# Patient Record
Sex: MALE | NOT HISPANIC OR LATINO | Employment: UNEMPLOYED | URBAN - NONMETROPOLITAN AREA
[De-identification: names, ages, dates, MRNs, and addresses within clinical notes are randomized per-mention and may not be internally consistent; named-entity substitution may affect disease eponyms.]

---

## 2018-05-10 ENCOUNTER — OFFICE VISIT (OUTPATIENT)
Dept: ORTHOPEDIC SURGERY | Facility: CLINIC | Age: 43
End: 2018-05-10

## 2018-05-10 VITALS — TEMPERATURE: 97.2 F | BODY MASS INDEX: 24.44 KG/M2 | WEIGHT: 165 LBS | HEIGHT: 69 IN

## 2018-05-10 DIAGNOSIS — M17.12 PRIMARY OSTEOARTHRITIS OF LEFT KNEE: ICD-10-CM

## 2018-05-10 DIAGNOSIS — M17.11 PRIMARY OSTEOARTHRITIS OF RIGHT KNEE: Primary | ICD-10-CM

## 2018-05-10 PROCEDURE — 99214 OFFICE O/P EST MOD 30 MIN: CPT | Performed by: ORTHOPAEDIC SURGERY

## 2018-05-10 RX ORDER — ARIPIPRAZOLE 2 MG/1
TABLET ORAL
COMMUNITY
Start: 2018-04-13

## 2018-05-10 RX ORDER — BUSPIRONE HYDROCHLORIDE 15 MG/1
TABLET ORAL
COMMUNITY
Start: 2018-04-13

## 2018-05-10 RX ORDER — GABAPENTIN 600 MG/1
TABLET ORAL
Refills: 5 | COMMUNITY
Start: 2018-03-13

## 2018-05-10 NOTE — PROGRESS NOTES
Chief Complaint   Patient presents with   • Left Knee - Follow-up   • Right Knee - Follow-up           HPI  Patient is here today for bilateral knee pain.Patient states that he is having pain and discomfort in both his knees.  He has difficulty going up and down the steps.  He has symptoms of instability of both his knees.  We had checked him out and found that there was no indication for surgical intervention.  He uses his brace all the time.  In fact he states that the straps and the supports for the knee brace have pulled out and he would like a new brace today for both his knees.  He has pain with numbness and tingling in both upper extremities.  He has been diagnosed with carpal tunnel syndrome in the past and would like braces for the hands as well.  The patient was recently seen by rheumatologist and some blood work has been performed.  The patient will go back to that doctor's office to see if he is a candidate for anti-inflammatory medication versus disease modifying agents orally.  He is also complaining of right ankle pain and discomfort.  The patient states that he needs a cane for assistance with ambulation because he has a high degree of propensity towards falling because of his knee and ankle disease.  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  He can no longer do that job because of the symptoms from his knees, his wrists and his ankle.  I have suggested to him that he should go before a disability hearing  and see if he can qualify for disability.  It is unlikely that he will go back to that form of employment because of his multiple orthopedic issues.         Vitals:    05/10/18 1404   Temp: 97.2 °F (36.2 °C)           Review of Systems   Constitutional: Negative.    HENT: Negative.    Respiratory: Negative.    Cardiovascular: Negative.    Gastrointestinal:  Negative.    Endocrine: Negative.    Genitourinary: Negative.    Musculoskeletal: Positive for gait problem.   Skin: Negative.    Allergic/Immunologic: Negative.    Hematological: Negative.    Psychiatric/Behavioral: Negative.            Physical Exam   Constitutional: He is oriented to person, place, and time. He appears well-nourished.   HENT:   Head: Atraumatic.   Eyes: EOM are normal.   Neck: Neck supple.   Cardiovascular: Normal rate, regular rhythm, normal heart sounds and intact distal pulses.    Pulmonary/Chest: Effort normal and breath sounds normal.   Abdominal: Bowel sounds are normal.   Musculoskeletal: He exhibits edema and tenderness.   Neurological: He is alert and oriented to person, place, and time. He has normal reflexes.   Skin: Skin is warm.   Psychiatric: He has a normal mood and affect. His behavior is normal. Judgment and thought content normal.   Nursing note and vitals reviewed.          Joint/Body Part Specific Exam:  bilateral knee. Patient has crepitus throughout range of motion. Positive patellar grind test. Mild effusion. Lachman is negative. Pivot shift is negative. Anterior and posterior drawer signs are negative. Significant joint line tenderness is noted on the medial aspect of the knee. Patient has a varus orientation of the knee. There is fullness and tenderness in the Popliteal fossa. Mild distention of a Popliteal cyst is noted in this location. Range of motion in flexion is from 0- 110° degrees. Neurovascular status is intact.  Dorsalis pedis and posterior tibial artery pulses are palpable. Common peroneal nerve function is well preserved. Patient's gait is cautious and antalgic. Skin and soft tissues are mildly swollen, consistent with synovitis and effusion. The patient has a significant limp with the first few steps after starting the gait cycle. Getting out of a chair takes a lot of effort due to pain on knee flexion.    bilateral wrist. Skin mildly swollen volarly. Radial  pulse is palpable. Capillary refill is 2 seconds with a brisk return. Positive Tinel’s sign at the wrist. Positive Phalen’s sign.  strength is impaired. Flexor and extensor tendon functions are well preserved. Moving 2 point discrimination is prolonged to 12mm over the median nerve distribution. No evidence of RSD.  X-RAY Report:        Diagnostics:        Kimani was seen today for follow-up and follow-up.    Diagnoses and all orders for this visit:    Primary osteoarthritis of right knee    Primary osteoarthritis of left knee            Procedures        Plan:  Use a supportive brace to both the left and the right knee to prevent the knees from buckling and giving out.    Tablet ibuprofen 600 mg orally twice a day for pain swelling and discomfort.    Use a supportive carpal tunnel brace to both upper extremities to minimize the pressure over the transverse carpal ligament.    Vitamin B6 And B12 100 µg tablet 1 by mouth daily for nerve function improvement.    Use a cane/walker for assistance with ambulation and to minimize falling.    Try articular steroid injection and Synvisc Visco supplementation for the knees discussed with the patient.    He is not a candidate for surgical intervention either on the knee or the upper extremity at this point.    His work status is limited to maximum lifting of no more than 10 pounds.  He is recommended not to climb on ladders and scaffolds.  And also the patient has been recommended to use a cane for assistance with ambulation.  These will be permanent restrictions for the patient.    Follow-up in my office in 6 months.    Time spent in the office today with the patient is 30 minutes.  Greater than 50% of my time was spent face-to-face with the patient about guiding him how to manage his symptoms and nonoperative care for the multiple joint involvement.

## 2018-10-03 ENCOUNTER — TELEPHONE (OUTPATIENT)
Dept: ORTHOPEDIC SURGERY | Facility: CLINIC | Age: 43
End: 2018-10-03

## 2018-10-03 NOTE — TELEPHONE ENCOUNTER
PATIENT LEFT VOICEMAIL AT 1:28 PM REQUESTING SOMEONE TO CALL TO VERIFY HIS NEXT APPOINTMENT WITH DR. BALDWIN.  I CALLED AND LEFT VOICEMAIL FOR PATIENT THAT HIS APPOINTMENT IS 11/01/18 @ 1:45 PM

## 2018-11-01 ENCOUNTER — OFFICE VISIT (OUTPATIENT)
Dept: ORTHOPEDIC SURGERY | Facility: CLINIC | Age: 43
End: 2018-11-01

## 2018-11-01 DIAGNOSIS — M17.11 PRIMARY OSTEOARTHRITIS OF RIGHT KNEE: Primary | ICD-10-CM

## 2018-11-01 DIAGNOSIS — M17.12 PRIMARY OSTEOARTHRITIS OF LEFT KNEE: ICD-10-CM

## 2018-11-01 PROCEDURE — 99213 OFFICE O/P EST LOW 20 MIN: CPT | Performed by: ORTHOPAEDIC SURGERY

## 2018-11-01 NOTE — PROGRESS NOTES
Chief Complaint   Patient presents with   • Left Knee - Follow-up   • Right Knee - Follow-up           HPI  Patient is here today for a six month follow up of his bilateral knees.  He patient states that he has increasing pain and discomfort in both his knees.  The pain is on the medial aspect of the knee.  He states that the right side has become so bad that he can barely walk.  He has difficulty going up and down the steps.  He states that he is using a wheeled walker now because he feels the knee will buckle and give out from underneath him.  His quality of life has become very negative.  He has pain and discomfort over both the wrists and states that the transverse carpal ligament is extremely painful for him.  He has some numbness and tingling in both the upper extremities consistent with compression of the median nerve over the volar aspect of the wrist.  He states that his  strength has become progressively compromised as well.  He has chronic low back pain with radiation of pain and discomfort in both his hips and his thighs.  He states that he needs an appointment with a pain clinic but is finding that hard to do so.  One of the issues is that he self medicates himself with cannabis and that is always showing up on his tox screens.  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  Unfortunately, I am unable to help him on this aspect of his management with long-term narcotic needs.  I have been very up front and clear with this patient in that respect.        There were no vitals filed for this visit.        Review of Systems   Constitutional: Negative.    HENT: Negative.    Eyes: Negative.    Respiratory: Negative.    Cardiovascular: Negative.    Gastrointestinal: Negative.    Endocrine: Negative.    Genitourinary: Negative.    Musculoskeletal: Positive for gait problem and joint swelling.   Skin: Negative.    Allergic/Immunologic: Negative.    Hematological: Negative.     Psychiatric/Behavioral: Negative.            Physical Exam   Constitutional: He is oriented to person, place, and time. He appears well-nourished.   HENT:   Head: Atraumatic.   Eyes: EOM are normal.   Neck: Neck supple.   Cardiovascular: Normal heart sounds and intact distal pulses.   Pulmonary/Chest: Breath sounds normal.   Abdominal: Bowel sounds are normal.   Musculoskeletal: He exhibits edema and tenderness.   Neurological: He is alert and oriented to person, place, and time.   Skin: Skin is dry. Capillary refill takes 2 to 3 seconds.   Psychiatric: He has a normal mood and affect. His behavior is normal. Thought content normal.   Nursing note and vitals reviewed.          Joint/Body Part Specific Exam:  bilateral knee. Patient has crepitus throughout range of motion. Positive patellar grind test. Mild effusion. Lachman is negative. Pivot shift is negative. Anterior and posterior drawer signs are negative. Significant joint line tenderness is noted on the medial aspect of the knee. Patient has a varus orientation of the knee. There is fullness and tenderness in the Popliteal fossa. Mild distention of a Popliteal cyst is noted in this location. Range of motion in flexion is from 0- 110 degrees. Neurovascular status is intact.  Dorsalis pedis and posterior tibial artery pulses are palpable. Common peroneal nerve function is well preserved. Patient's gait is cautious and antalgic. Skin and soft tissues are mildly swollen, consistent with synovitis and effusion. The patient has a significant limp with the first few steps after starting the gait cycle. Getting out of a chair takes a lot of effort due to pain on knee flexion.    Lumbar Spine: The overlying skin and soft tissues are mildly swollen. Deep tendon reflexes are bilaterally, symmetric and equal.  No long tract signs are noted. There is No evidence of myelopathy. No bowel or bladder deficit noted. Mild spasm of erector spinae muscle is noted. bilaterally  sided rotation is associated with pain and discomfort. Straight leg raise test is positive to 60 degrees. Contralateral straight leg raise is negative. Pain radiates to buttock and thigh. Get up and go is slow due to the lumbar pain.No objective motor or sensory function loss is noted.     bilateral wrist. The Skin is  mildly swollen volarly over the proximal crease of the wrist. Radial pulse is palpable. Capillary refill is 2 seconds with a brisk return. Positive Tinel’s sign at the wrist. Positive Phalen’s sign at the wrist .  strength is impaired.  There is no muscle wasting or atrophy specifically involving the thenar muscle group.  Sensation to light touch and pinprick are diminished over the thumb, index and middle fingers.  Flexor and extensor tendon functions are well preserved. Moving 2 point discrimination is prolonged to 12mm over the median nerve distribution. No evidence of RSD.  There is no clinical evidence of renal disease, thyroid disease or any generalized neurological condition that could be causing nerve dysfunction.  X-RAY Report:        Diagnostics:        Kimani was seen today for follow-up and follow-up.    Diagnoses and all orders for this visit:    Primary osteoarthritis of right knee    Primary osteoarthritis of left knee            Procedures        Plan: Use a supportive brace on both the knees to prevent the knees from buckling and giving out.    Use a brace on the wrist to minimize the pressure over the transverse carpal ligament and to minimize the symptoms of carpal tunnel syndrome.    Sae back school exercise program with stretching and strengthening exercises.    Tablet ibuprofen 600 mg orally twice a day for pain swelling and discomfort.    The patient will have his PCP to attempts to make him an appointment with a pain clinic so that his need for chronic long-term use of narcotics can be addressed in that fashion.  Think    Work restrictions placed on the patient.  He is  limited to lifting a maximum of 10 pounds and no more than that.  He is to use a cane/walker to prevent falls.  No ladders or scaffolds are allowed for this patient.  These restrictions are on a permanent ongoing basis.    Calcium and vitamin D for bone health.    Follow-up in my office in 6 months for reevaluation

## 2019-04-02 ENCOUNTER — OFFICE VISIT (OUTPATIENT)
Dept: ORTHOPEDIC SURGERY | Facility: CLINIC | Age: 44
End: 2019-04-02

## 2019-04-02 VITALS — BODY MASS INDEX: 28.88 KG/M2 | TEMPERATURE: 98.1 F | HEIGHT: 69 IN | WEIGHT: 195 LBS

## 2019-04-02 DIAGNOSIS — M75.41 SHOULDER IMPINGEMENT SYNDROME, RIGHT: ICD-10-CM

## 2019-04-02 DIAGNOSIS — M79.642 HAND PAIN, LEFT: Primary | ICD-10-CM

## 2019-04-02 DIAGNOSIS — M19.042 PRIMARY OSTEOARTHRITIS OF BOTH HANDS: ICD-10-CM

## 2019-04-02 DIAGNOSIS — G56.03 BILATERAL CARPAL TUNNEL SYNDROME: ICD-10-CM

## 2019-04-02 DIAGNOSIS — M19.041 PRIMARY OSTEOARTHRITIS OF BOTH HANDS: ICD-10-CM

## 2019-04-02 PROCEDURE — 99213 OFFICE O/P EST LOW 20 MIN: CPT | Performed by: ORTHOPAEDIC SURGERY

## 2019-04-02 PROCEDURE — 73120 X-RAY EXAM OF HAND: CPT | Performed by: ORTHOPAEDIC SURGERY

## 2019-04-02 NOTE — PROGRESS NOTES
NEW/FOLLOW UP VISIT    Patient: Kimani Michaels  ?  YOB: 1975  ?  Chief Complaint   Patient presents with   • Left Hand - Pain      ?  HPI: This patient has bilateral upper extremity pain and discomfort.  He states that the pain starts in his shoulders and radiates down the arm.  He has been having locking and triggering of the left fourth and fifth digits.  He states that he is in constant pain normal in the upper extremities but in the lower extremities as well.  He denies any history of trauma.  The patient states that he cannot play his guitar for more than 30 minutes at a time.  He has numbness and tingling radiated to the thumb, middle and index fingers.   strength is getting progressively compromised.  He has a sense of swelling and tightness in the PIP and DIP joint of the fingers.  The patient states since he has to shake his hand constantly to restore the sensation to the upper extremity.      This patient is an established patient.  This problem is not new to this examiner.    Allergies: No Known Allergies    Medications:   Home Medications:  Current Outpatient Medications on File Prior to Visit   Medication Sig   • acetaminophen-codeine (TYLENOL #3) 300-30 MG per tablet TK 1 T PO Q 8 H PRN P.   • ARIPiprazole (ABILIFY) 2 MG tablet    • baclofen (LIORESAL) 10 MG tablet TK 1 T PO BID PRN.   • busPIRone (BUSPAR) 15 MG tablet    • gabapentin (NEURONTIN) 400 MG capsule TK ONE C PO  TID   • gabapentin (NEURONTIN) 600 MG tablet TK 1 T PO TID   • lidocaine-prilocaine (EMLA) cream    • meloxicam (MOBIC) 15 MG tablet TK 1 T PO ONCE D.     No current facility-administered medications on file prior to visit.      Current Medications:  Scheduled Meds:  PRN Meds:.    I have reviewed the patient's medical history in detail and updated the computerized patient record.  Review and summarization of old records include:    Past Medical History:   Diagnosis Date   • Depression    • Rheumatoid arthritis  "(CMS/[facility])      Past Surgical History:   Procedure Laterality Date   • KNEE SURGERY     • TONSILLECTOMY       Social History     Occupational History   • Not on file   Tobacco Use   • Smoking status: Current Every Day Smoker   Substance and Sexual Activity   • Alcohol use: No   • Drug use: Not on file   • Sexual activity: Not on file      Social History     Social History Narrative   • Not on file     Family History   Family history unknown: Yes         Review of Systems   Constitutional: Negative.    HENT: Negative.    Eyes: Negative.    Respiratory: Negative.    Cardiovascular: Negative.    Gastrointestinal: Negative.    Endocrine: Negative.    Musculoskeletal: Positive for joint swelling.   Skin: Negative.    Allergic/Immunologic: Negative.    Neurological: Negative.    Hematological: Negative.    Psychiatric/Behavioral: Negative.           Wt Readings from Last 3 Encounters:   04/02/19 88.5 kg (195 lb)   05/10/18 74.8 kg (165 lb)     Ht Readings from Last 3 Encounters:   04/02/19 174 cm (68.5\")   05/10/18 175.3 cm (69\")     Body mass index is 29.22 kg/m².  Facility age limit for growth percentiles is 20 years.  Vitals:    04/02/19 1026   Temp: 98.1 °F (36.7 °C)         Physical Exam   Constitutional: Patient is oriented to person, place, and time. Appears well-developed and well-nourished.   HENT:   Head: Normocephalic and atraumatic.   Eyes: Conjunctivae and EOM are normal. Pupils are equal, round, and reactive to light.   Cardiovascular: Normal rate, regular rhythm, normal heart sounds and intact distal pulses.   Pulmonary/Chest: Effort normal and breath sounds normal.   Musculoskeletal:   See detailed exam below   Neurological: Alert and oriented to person, place, and time. No sensory deficit. Coordination normal.   Skin: Skin is warm and dry. Capillary refill takes less than 2 seconds. No rash noted. No erythema.   Psychiatric: Patient has a normal mood and affect. Her behavior is normal. Judgment and thought " content normal.   Nursing note and vitals reviewed.    Ortho Exam:   bilateral Shoulder. Patient has signs of impingement with internal and external rotation. There is a lot of pain and tenderness for the patient over the subcromial bursa. Montanez’s sign is positive. Neer sign is positive. Forward flexion is 0-130 degrees, abduction is 0-130 degrees, external rotation is 0-40 degrees. Rotator cuff function is fairly well preserved except for the impingement at 90 degrees. Apprehension sign is negative. Axillary nerve function is well preserved. Radial artery pulses are palpable. There is no evidence of multidirectional instability. Sulcus sign is negative. Drop arm sign is negative. The patient has some ill-defined tenderness over the greater tuberosity of the humerus. The pain level is 6.    bilateral wrist. The Skin is  mildly swollen volarly over the proximal crease of the wrist. Radial pulse is palpable. Capillary refill is 2 seconds with a brisk return. Positive Tinel’s sign at the wrist. Positive Phalen’s sign at the wrist .  strength is impaired.  There is no muscle wasting or atrophy specifically involving the thenar muscle group.  Sensation to light touch and pinprick are diminished over the thumb, index and middle fingers.  Flexor and extensor tendon functions are well preserved. Moving 2 point discrimination is prolonged to 12mm over the median nerve distribution. No evidence of RSD.  There is no clinical evidence of renal disease, thyroid disease or any generalized neurological condition that could be causing nerve dysfunction.    Bilateral hand. Patient has a significant pain and swelling over the dorsal aspect of the DIP/PIP joints and multiple joints of the hand. There is the presence of Heberden’s nodes. There is some deformity with ulnar and radial deviation of the distal part of the DIP joint. Flexor and extensor tendon functions are well preserved. Patient makes a fist slowly and painfully so.   strength to both pinch and power  is limited and the patient has a difficult time making a fist. Local tenderness is consistent with both arthritis as well as synovitis. There is some pitting of the nail plate.    left wrist. Trigger fourth and fifth digits on the left hand. Skin is swollen over volar aspect of MCP joint. Flexor and extensor tendon functions are both well maintained. Tender nodule over the volar aspect of MCP joint. Capillary refill is two seconds with a brisk return. Triggering is passively correctable. Median nerve function is normal. Radial artery pulse is palpable.  Diagnostics: X-rays from an outside facility are reviewed.  X-rays of the wrist AP and lateral show some narrowing of the radiocarpal joint but otherwise the joint space and contour is fairly well-preserved.       Assessment:  Kimani was seen today for pain.    Diagnoses and all orders for this visit:    Hand pain, left  -     XR Hand 2 View Left    Bilateral carpal tunnel syndrome    Primary osteoarthritis of both hands    Shoulder impingement syndrome, right          Procedures  ?    Plan  Avoid repetitive lifting and loading of the upper extremity to minimize trauma to the median nerve.  Vitamin B6, B12 100 mcg tab 1 p.o. daily for nerve function improvement.  Injection to the hypertrophic A1 pulley of the third fourth and fifth digits on the left hand discussed with the patient and he will let me know if his symptoms are bad enough for that form of intervention.  Active aggressive range of motion of the shoulder to prevent arthrofibrosis and a frozen shoulder.  Intra-articular injection of steroid to the shoulder discussed with the patient.  For the extent of diagnosis of compression of the median nerve, he will need an EMG/nerve conduction study and the patient will let me know when he is ready for that form of diagnostic intervention.  · Compression/brace for the upper extremity has pressure over the transverse carpal  ligament.  · Rest, ice, compression, and elevation (RICE) therapy  · Stretching and strengthening exercises  · OTC Tylenol 1000mg by mouth every 4-6 hours as needed for pain   · Follow up in 6 month(s)      Saud Paredes MD  4/21/2019

## 2019-04-21 PROBLEM — M79.642 HAND PAIN, LEFT: Status: ACTIVE | Noted: 2019-04-21

## 2019-04-21 PROBLEM — M19.042 PRIMARY OSTEOARTHRITIS OF BOTH HANDS: Status: ACTIVE | Noted: 2019-04-21

## 2019-04-21 PROBLEM — M75.41 SHOULDER IMPINGEMENT SYNDROME, RIGHT: Status: ACTIVE | Noted: 2019-04-21

## 2019-04-21 PROBLEM — M19.041 PRIMARY OSTEOARTHRITIS OF BOTH HANDS: Status: ACTIVE | Noted: 2019-04-21

## 2019-05-02 ENCOUNTER — OFFICE VISIT (OUTPATIENT)
Dept: ORTHOPEDIC SURGERY | Facility: CLINIC | Age: 44
End: 2019-05-02

## 2019-05-02 DIAGNOSIS — G56.03 BILATERAL CARPAL TUNNEL SYNDROME: Primary | ICD-10-CM

## 2019-05-02 DIAGNOSIS — M17.11 PRIMARY OSTEOARTHRITIS OF RIGHT KNEE: ICD-10-CM

## 2019-05-02 DIAGNOSIS — M75.41 SHOULDER IMPINGEMENT SYNDROME, RIGHT: ICD-10-CM

## 2019-05-02 PROCEDURE — 99213 OFFICE O/P EST LOW 20 MIN: CPT | Performed by: ORTHOPAEDIC SURGERY

## 2019-05-02 RX ORDER — MELOXICAM 7.5 MG/1
TABLET ORAL
Qty: 90 TABLET | Refills: 1 | Status: SHIPPED | OUTPATIENT
Start: 2019-05-02

## 2019-11-07 ENCOUNTER — OFFICE VISIT (OUTPATIENT)
Dept: ORTHOPEDIC SURGERY | Facility: CLINIC | Age: 44
End: 2019-11-07

## 2019-11-07 VITALS — TEMPERATURE: 98.2 F | HEIGHT: 70 IN | WEIGHT: 195 LBS | BODY MASS INDEX: 27.92 KG/M2

## 2019-11-07 DIAGNOSIS — G56.03 BILATERAL CARPAL TUNNEL SYNDROME: Primary | ICD-10-CM

## 2019-11-07 DIAGNOSIS — M17.12 PRIMARY OSTEOARTHRITIS OF LEFT KNEE: ICD-10-CM

## 2019-11-07 DIAGNOSIS — M19.041 PRIMARY OSTEOARTHRITIS OF BOTH HANDS: ICD-10-CM

## 2019-11-07 DIAGNOSIS — M19.042 PRIMARY OSTEOARTHRITIS OF BOTH HANDS: ICD-10-CM

## 2019-11-07 DIAGNOSIS — M77.8 TENDINITIS OF BOTH ELBOWS: ICD-10-CM

## 2019-11-07 DIAGNOSIS — M17.11 PRIMARY OSTEOARTHRITIS OF RIGHT KNEE: ICD-10-CM

## 2019-11-07 PROCEDURE — 99213 OFFICE O/P EST LOW 20 MIN: CPT | Performed by: ORTHOPAEDIC SURGERY

## 2019-11-12 PROBLEM — M77.8 TENDINITIS OF BOTH ELBOWS: Status: ACTIVE | Noted: 2019-11-12

## 2019-11-12 NOTE — PROGRESS NOTES
FOLLOW UP VISIT    Patient: Kimani Michaels  ?  YOB: 1975    MRN: [id number]  ?  Chief Complaint   Patient presents with   • Left Knee - Follow-up   • Right Knee - Follow-up   • Left Wrist - Follow-up   • Right Wrist - Follow-up      ?  HPI: The patient has multiple sites of muscular skeletal complaints.  He states that both his knees are bothering him to some degree.  He also has bilateral carpal tunnel syndrome and bilateral elbow tendinitis.  The symptoms are ongoing.  He has been seen by rheumatologist and it has been determined that he basically has osteoarthritis at multiple locations.  There is possibility of fibromyalgia as well which is most likely causing repetitive symptoms.  He does not have his seropositive traumatic disease that can be treated with disease modifying agents.  We have counseled the patient aggressive range of motion exercises and anti-inflammatory medication he also has braces for his wrists he is to support the function of those joints.    Pain Location: bilateral knee(s) and bilateral elbow  Radiation: none  Quality: dull, aching  Intensity/Severity: moderate  Duration: several year(s)  Onset quality: gradual   Timing: intermittent  Aggravating Factors: any weight bearing, going up and down stairs, kneeling, pivoting, rising after sitting, squatting, reaching forward, reaching backward and repetitive motion  Alleviating Factors: OTC analgesics, NSAID's and cold  Previous Episodes: yes  Associated Symptoms: pain, clicking/popping, decreased strength, numbness/tingling  ADL Affected: ambulating and grooming  Previous Treatment: Anti-inflammatory medication and intra-articular injections.    This patient is an established patient.  This problem is not new to this examiner.      Allergies: No Known Allergies    Medications:   Home Medications:  Current Outpatient Medications on File Prior to Visit   Medication Sig   • acetaminophen-codeine (TYLENOL #3) 300-30 MG per tablet TK  "1 T PO Q 8 H PRN P.   • ARIPiprazole (ABILIFY) 2 MG tablet    • baclofen (LIORESAL) 10 MG tablet TK 1 T PO BID PRN.   • busPIRone (BUSPAR) 15 MG tablet    • gabapentin (NEURONTIN) 400 MG capsule TK ONE C PO  TID   • gabapentin (NEURONTIN) 600 MG tablet TK 1 T PO TID   • lidocaine-prilocaine (EMLA) cream    • meloxicam (MOBIC) 7.5 MG tablet 1 Oral Daily with food.     No current facility-administered medications on file prior to visit.      Current Medications:  Scheduled Meds:  PRN Meds:.    I have reviewed the patient's medical history in detail and updated the computerized patient record.  Review and summarization of old records include:    Past Medical History:   Diagnosis Date   • Depression    • Rheumatoid arthritis (CMS/HCC)      Past Surgical History:   Procedure Laterality Date   • KNEE SURGERY     • TONSILLECTOMY       Social History     Occupational History   • Not on file   Tobacco Use   • Smoking status: Current Every Day Smoker   Substance and Sexual Activity   • Alcohol use: No   • Drug use: Not on file   • Sexual activity: Not on file      Social History     Social History Narrative   • Not on file     Family History   Family history unknown: Yes         Review of Systems  Constitutional: Negative for appetite change.   HENT: Negative.    Eyes: Negative.    Respiratory: Negative.    Cardiovascular: Negative.    Gastrointestinal: Negative.    Endocrine: Negative.    Genitourinary: Negative.    Musculoskeletal: See details of exam below.  Skin: Negative.    Allergic/Immunologic: Negative.    Hematological: Negative.    Psychiatric/Behavioral: Negative.         Wt Readings from Last 3 Encounters:   11/07/19 88.5 kg (195 lb)   04/02/19 88.5 kg (195 lb)   05/10/18 74.8 kg (165 lb)     Ht Readings from Last 3 Encounters:   11/07/19 177.8 cm (70\")   04/02/19 174 cm (68.5\")   05/10/18 175.3 cm (69\")     Body mass index is 27.98 kg/m².  Facility age limit for growth percentiles is 20 years.  Vitals:    " 11/07/19 1353   Temp: 98.2 °F (36.8 °C)         Physical Exam  Constitutional: Patient is oriented to person, place, and time. Appears well-developed and well-nourished.   HENT:   Head: Normocephalic and atraumatic.   Eyes: Conjunctivae and EOM are normal. Pupils are equal, round, and reactive to light.   Cardiovascular: Normal rate, regular rhythm, normal heart sounds and intact distal pulses.   Pulmonary/Chest: Effort normal and breath sounds normal.   Musculoskeletal:   See detailed exam below   Neurological: Alert and oriented to person, place, and time. No sensory deficit. Coordination normal.   Skin: Skin is warm and dry. Capillary refill takes less than 2 seconds. No rash noted. No erythema.   Psychiatric: Patient has a normal mood and affect. His behavior is normal. Judgment and thought content normal.   Nursing note and vitals reviewed.      Ortho Exam:   Bilateral knee (cmp). The patients' patellar grind test is exquisitely postive.  A lot of pain and discomfort in the retropatellar area. The patient has high Q-angle. Range of motion is from 0-120 degrees of flexion. Anterior and posterior drawer signs are negative. No medial or lateral instability is noted. The patella tends to track laterally in high grades of flexion. A Slightly positive apprehension sign is noted. There is some tenderness over the medial patellofemoral ligaments. Skin and soft tissues are swollen; consistent with inflammatory changes of the patellofemoral joint. Dorsalis pedis and posterior tibial artery pulses are palpable. Common peroneal nerve function is well preserved. Quad mechanism is well preserved.      Bilateral wrist-carpal tunnel. The patient is right hand dominant. The Skin is mildly swollen volarly over the proximal crease of the wrist. Radial pulse is palpable. Capillary refill is 2 seconds with a brisk return. Positive Tinel's sign at the wrist. Positive Phalen's sign at the wrist .  strength is impaired.  There is  no muscle wasting or atrophy specifically involving the thenar muscle group.  Sensation to light touch and pinprick are diminished over the thumb, index and middle fingers.  Flexor and extensor tendon functions are well preserved. Moving 2 point discrimination is prolonged to 12mm over the median nerve distribution. No evidence of RSD.  There is no clinical evidence of renal disease, thyroid disease or any generalized neurological condition that could be causing nerve dysfunction.    Bilateral elbow-lateral epicondylitis. Relationship of 3 bony points is well preserved. There is no evidence of posterior interosseous nerve palsy. Mild effusion is noted of the elbow. There is no ulnar neuritis. Patient has a lot of pain and tenderness over the lateral/medial aspect of the elbow. Range of motion of the elbow is 0- 130 degrees of extension (nml 0), 0-90 degrees of supination (nml 0-90). Extension of the wrist against resistance bothers the patient significantly. Radial artery pulses are palpable. Skin and soft tissues are slightly swollen. There is point tenderness over the flexor pronator muscle mass/extensor supinator muscle mass.      Diagnostics:  no diagnostic testing performed this visit       Assessment:  Kimani was seen today for follow-up, follow-up, follow-up and follow-up.    Diagnoses and all orders for this visit:    Bilateral carpal tunnel syndrome    Primary osteoarthritis of both hands    Primary osteoarthritis of left knee    Primary osteoarthritis of right knee    Tendinitis of both elbows          Procedures  ?    Plan    · Compression/brace to both the knees to prevent them from buckling and giving out.  · Use a cock-up splint for the wrists bilaterally to prevent excess pressure over the transverse carpal ligament.  · Vitamin B6, B12 100 mcg tab 1 p.o. daily for nerve function improvement.  · Calcium and vitamin D for bone health.  · Glucosamine, chondroitin and turmeric for cartilage  health.  · Intra-articular steroid injection to the knees and steroid injection to the elbow discussed with the patient.  · Rest, ice, compression, and elevation (RICE) therapy  · Stretching and strengthening exercises of the quads and the hamstrings.    · Nonoperative management of conditions discussed with the patient at length.  · OTC Tylenol 500-1000mg by mouth every 6 hours as needed for pain   · Follow up in 6 month(s)    Date of encounter: 11/07/2019   Saud Paredes MD

## 2020-05-07 ENCOUNTER — OFFICE VISIT (OUTPATIENT)
Dept: ORTHOPEDIC SURGERY | Facility: CLINIC | Age: 45
End: 2020-05-07

## 2020-05-07 VITALS — WEIGHT: 180 LBS | TEMPERATURE: 98.3 F | HEIGHT: 69 IN | BODY MASS INDEX: 26.66 KG/M2

## 2020-05-07 DIAGNOSIS — M75.41 SHOULDER IMPINGEMENT SYNDROME, RIGHT: ICD-10-CM

## 2020-05-07 DIAGNOSIS — G56.03 BILATERAL CARPAL TUNNEL SYNDROME: ICD-10-CM

## 2020-05-07 DIAGNOSIS — M17.11 PRIMARY OSTEOARTHRITIS OF RIGHT KNEE: ICD-10-CM

## 2020-05-07 DIAGNOSIS — M17.12 PRIMARY OSTEOARTHRITIS OF LEFT KNEE: Primary | ICD-10-CM

## 2020-05-07 DIAGNOSIS — M19.041 PRIMARY OSTEOARTHRITIS OF BOTH HANDS: ICD-10-CM

## 2020-05-07 DIAGNOSIS — M19.042 PRIMARY OSTEOARTHRITIS OF BOTH HANDS: ICD-10-CM

## 2020-05-07 PROCEDURE — 99213 OFFICE O/P EST LOW 20 MIN: CPT | Performed by: ORTHOPAEDIC SURGERY

## 2020-05-07 RX ORDER — BUPROPION HYDROCHLORIDE 150 MG/1
TABLET ORAL DAILY
COMMUNITY
Start: 2020-03-30

## 2020-05-07 RX ORDER — FAMOTIDINE 20 MG/1
TABLET, FILM COATED ORAL
COMMUNITY
Start: 2020-02-24

## 2020-05-07 NOTE — PROGRESS NOTES
FOLLOW UP VISIT    Patient: Kimani Michaels  ?  YOB: 1975    MRN: [id number]  ?  Chief Complaint   Patient presents with   • Right Wrist - Follow-up, Pain   • Left Wrist - Pain, Follow-up   CC: Follow-up on bilateral wrist pain, bilateral hand pain and bilateral knee osteoarthritis.  ?  HPI: FU BILATERAL WRISTS  Kimani Contreras continues to have symptoms in different locations of the body.  Both his hands bother him significantly.  He has weakness of  strength.  He also has numbness and tingling along the thumb middle and the index fingers.  Both his knees are also symptomatic.  He has a difficult time going up and down the steps.  Occasionally the knees will buckle and give out from underneath him.  He states that he uses braces regularly on his hands, wrists and his knees.  He is also on gabapentin provided to him by his primary care physician for managing the symptoms of neuropathy in the upper extremities.  He is not interested in any form of surgical intervention on either the hands to relieve the carpal tunnel syndrome or on his knees.      Pain Location: bilateral knee and bilateral wrist  Radiation: none  Quality: dull, aching  Intensity/Severity: moderate  Duration: 2-3 years  Onset quality: gradual   Timing: intermittent  Aggravating Factors: kneeling, rising after sitting, walking, squatting, repetitive motion  Alleviating Factors: Tylenol, NSAIDs  Previous Episodes: yes  Associated Symptoms: pain, swelling, decreased strength  ADLs Affected: grooming/hygiene/toileting/personal care, ambulating  Previous Treatment: Anti-inflammatory medication and use of a brace on the knees.    This patient is an established patient.  This problem is not new to this examiner.      Allergies: No Known Allergies    Medications:   Home Medications:  Current Outpatient Medications on File Prior to Visit   Medication Sig   • acetaminophen-codeine (TYLENOL #3) 300-30 MG per tablet TK 1 T PO Q 8 H PRN P.    • ARIPiprazole (ABILIFY) 2 MG tablet    • baclofen (LIORESAL) 10 MG tablet TK 1 T PO BID PRN.   • buPROPion XL (WELLBUTRIN XL) 150 MG 24 hr tablet Take  by mouth Daily.   • busPIRone (BUSPAR) 15 MG tablet    • famotidine (PEPCID) 20 MG tablet TK 1 T PO BID FOR REFLUX   • gabapentin (NEURONTIN) 400 MG capsule TK ONE C PO  TID   • gabapentin (NEURONTIN) 600 MG tablet TK 1 T PO TID   • lidocaine-prilocaine (EMLA) cream    • meloxicam (MOBIC) 7.5 MG tablet 1 Oral Daily with food.   • oxaprozin (DAYPRO) 600 MG tablet TK 2 TS PO D WF     No current facility-administered medications on file prior to visit.      Current Medications:  Scheduled Meds:  PRN Meds:.    I have reviewed the patient's medical history in detail and updated the computerized patient record.  Review and summarization of old records include:    Past Medical History:   Diagnosis Date   • Depression    • Rheumatoid arthritis (CMS/HCC)      Past Surgical History:   Procedure Laterality Date   • KNEE SURGERY     • TONSILLECTOMY       Social History     Occupational History   • Not on file   Tobacco Use   • Smoking status: Current Every Day Smoker   Substance and Sexual Activity   • Alcohol use: No   • Drug use: Not on file   • Sexual activity: Not on file      Family History   Family history unknown: Yes         Review of Systems   Constitutional: Negative.  Negative for fever.   HENT: Negative.    Eyes: Negative.    Respiratory: Negative.    Cardiovascular: Negative.    Endocrine: Negative.    Genitourinary: Negative.    Musculoskeletal: Positive for arthralgias, gait problem and joint swelling.   Skin: Negative.  Negative for rash and wound.   Allergic/Immunologic: Negative.    Neurological: Negative for numbness.   Hematological: Negative.    Psychiatric/Behavioral: Negative.           Wt Readings from Last 3 Encounters:   05/07/20 81.6 kg (180 lb)   11/07/19 88.5 kg (195 lb)   04/02/19 88.5 kg (195 lb)     Ht Readings from Last 3 Encounters:  "  05/07/20 175.3 cm (69\")   11/07/19 177.8 cm (70\")   04/02/19 174 cm (68.5\")     Body mass index is 26.58 kg/m².  Facility age limit for growth percentiles is 20 years.  Vitals:    05/07/20 1332   Temp: 98.3 °F (36.8 °C)         Physical Exam  Constitutional: Patient is oriented to person, place, and time. Appears well-developed and well-nourished.   HENT:   Head: Normocephalic and atraumatic.   Eyes: Conjunctivae and EOM are normal. Pupils are equal, round, and reactive to light.   Cardiovascular: Normal rate, regular rhythm, normal heart sounds and intact distal pulses.   Pulmonary/Chest: Effort normal and breath sounds normal.   Musculoskeletal:   See detailed exam below   Neurological: Alert and oriented to person, place, and time. No sensory deficit. Coordination normal.   Skin: Skin is warm and dry. Capillary refill takes less than 2 seconds. No rash noted. No erythema.   Psychiatric: Patient has a normal mood and affect. His behavior is normal. Judgment and thought content normal.   Nursing note and vitals reviewed.      Ortho Exam:   Bilateral knee (varus). Patient has crepitus throughout range of motion. Positive patellar grind test. Mild effusion. Lachman is negative. Pivot shift is negative. Anterior and posterior drawer signs are negative. Significant joint line tenderness is noted on the medial aspect of the knee. Patient has a varus orientation of the knee. There is fullness and tenderness in the Popliteal fossa. Mild distention of a Popliteal cyst is noted in this location. Range of motion in flexion is from 0-120 degrees. Neurovascular status is intact.  Dorsalis pedis and posterior tibial artery pulses are palpable. Common peroneal nerve function is well preserved. Patient's gait is cautious and antalgic. Skin and soft tissues are mildly swollen, consistent with synovitis and effusion. The patient has a significant limp with the first few steps after starting the gait cycle. Getting out of a chair " takes a lot of effort due to pain on knee flexion.   Bilateral hand-DJD. The patient is right hand dominant. Patient has a significant pain and swelling over the dorsal aspect of the DIP/PIP joints and multiple joints of the hand. There is the presence of Heberden's nodes. There is some deformity with ulnar and radial deviation of the distal part of the DIP joint. Flexor and extensor tendon functions are well preserved. Patient makes a fist slowly and painfully so.  strength to both pinch and power  is limited and the patient has a difficult time making a fist. Local tenderness is consistent with both arthritis as well as synovitis. There is some pitting of the nail plate.  Bilateral wrist-carpal tunnel. The patient is right hand dominant. The Skin is mildly swollen volarly over the proximal crease of the wrist. Radial pulse is palpable. Capillary refill is 2 seconds with a brisk return. Positive Tinel's sign at the wrist. Positive Phalen's sign at the wrist .  strength is impaired.  There is no muscle wasting or atrophy specifically involving the thenar muscle group.  Sensation to light touch and pinprick are diminished over the thumb, index and middle fingers.  Flexor and extensor tendon functions are well preserved. Moving 2 point discrimination is prolonged to 12mm over the median nerve distribution. No evidence of RSD.  There is no clinical evidence of renal disease, thyroid disease or any generalized neurological condition that could be causing nerve dysfunction.      Diagnostics:  no diagnostic testing performed this visit      Assessment:  Kimani was seen today for follow-up, pain, pain and follow-up.    Diagnoses and all orders for this visit:    Primary osteoarthritis of left knee    Primary osteoarthritis of right knee    Bilateral carpal tunnel syndrome    Primary osteoarthritis of both hands    Shoulder impingement syndrome, right          Procedures  ?    Plan    · Compression/brace to both  knees.  · Injection of steroid and Visco supplementation for the knees discussed with the patient.  · Calcium and vitamin D for bone health.  · Glucosamine, chondroitin and turmeric for cartilage health.  · Vitamin B6, B12 100 mcg tab 1 p.o. daily for nerve function improvement over both median nerves.  · Avoid repetitive loading and lifting with both the upper extremity specifically the use of pneumatic tools.  · Rest, ice, compression, and elevation (RICE) therapy  · Stretching and strengthening exercises of the quads and the hamstrings.  · Tylenol 500-1000mg by mouth every 6 hours as needed for pain   · Follow up in 6 month(s)    Date of encounter: 05/07/2020   Saud Paredes MD

## 2021-04-21 DIAGNOSIS — M25.532 BILATERAL WRIST PAIN: ICD-10-CM

## 2021-04-21 DIAGNOSIS — M17.12 PRIMARY OSTEOARTHRITIS OF LEFT KNEE: ICD-10-CM

## 2021-04-21 DIAGNOSIS — M25.531 BILATERAL WRIST PAIN: ICD-10-CM

## 2021-04-21 DIAGNOSIS — M17.11 PRIMARY OSTEOARTHRITIS OF RIGHT KNEE: Primary | ICD-10-CM

## 2021-05-06 ENCOUNTER — HOSPITAL ENCOUNTER (OUTPATIENT)
Dept: OTHER | Facility: HOSPITAL | Age: 46
Discharge: HOME | End: 2021-05-06
Attending: NURSE PRACTITIONER

## 2021-05-12 ENCOUNTER — HOSPITAL ENCOUNTER (OUTPATIENT)
Dept: VACCINE CLINIC | Facility: HOSPITAL | Age: 46
Discharge: HOME | End: 2021-05-12
Attending: INTERNAL MEDICINE

## 2022-08-08 DIAGNOSIS — M25.511 RIGHT SHOULDER PAIN, UNSPECIFIED CHRONICITY: Primary | ICD-10-CM

## 2022-08-17 ENCOUNTER — OFFICE VISIT (OUTPATIENT)
Dept: ORTHOPEDIC SURGERY | Facility: CLINIC | Age: 47
End: 2022-08-17

## 2022-08-17 ENCOUNTER — HOSPITAL ENCOUNTER (OUTPATIENT)
Dept: GENERAL RADIOLOGY | Facility: HOSPITAL | Age: 47
Discharge: HOME | End: 2022-08-17
Admitting: PHYSICIAN ASSISTANT

## 2022-08-17 VITALS — HEIGHT: 68 IN | WEIGHT: 200 LBS | BODY MASS INDEX: 30.31 KG/M2 | TEMPERATURE: 98.6 F

## 2022-08-17 DIAGNOSIS — M17.12 PRIMARY OSTEOARTHRITIS OF LEFT KNEE: ICD-10-CM

## 2022-08-17 DIAGNOSIS — R20.2 NUMBNESS AND TINGLING IN RIGHT HAND: Primary | ICD-10-CM

## 2022-08-17 DIAGNOSIS — M17.11 PRIMARY OSTEOARTHRITIS OF RIGHT KNEE: ICD-10-CM

## 2022-08-17 DIAGNOSIS — M25.511 RIGHT SHOULDER PAIN, UNSPECIFIED CHRONICITY: ICD-10-CM

## 2022-08-17 DIAGNOSIS — R20.0 NUMBNESS AND TINGLING IN RIGHT HAND: Primary | ICD-10-CM

## 2022-08-17 PROCEDURE — 73030 X-RAY EXAM OF SHOULDER: CPT

## 2022-08-17 PROCEDURE — 99214 OFFICE O/P EST MOD 30 MIN: CPT | Performed by: PHYSICIAN ASSISTANT

## 2022-08-17 NOTE — PROGRESS NOTES
"Chief Complaint  Pain and Establish Care of the Right Shoulder    Subjective    History of Present Illness      Kimani Michaels is a 47 y.o. male who presents to [facility] for new complaint of  Shoulder Pain: Patient complains of right shoulder pain. The onset of pain was years ago. He also reports weakness but states his ROM has been fairly good. He denies specific injury to the shoulder. His symptoms increase with activity, especially over the head. He is currently on celebrex without much relief. He states he has previously had steroid injections in the knees, but they did not provide lasting relief. He is also using a cane to assist with ambulation.     He also comes reporting numbness and tingling in the right 4th and 5th fingers. This has also been present for several years but has become more constant in the last several months. Symptoms are worsened by bending the elbow or putting pressure on the elbow.       Objective   Vital Signs:   Temp 98.6 °F (37 °C)   Ht 172.7 cm (68\")   Wt 90.7 kg (200 lb)   BMI 30.41 kg/m²     Physical Exam  Vitals signs and nursing note reviewed.   Constitutional:       Appearance: Normal appearance.   Pulmonary:      Effort: Pulmonary effort is normal.   Skin:     General: Skin is warm and dry.      Capillary Refill: Capillary refill takes less than 2 seconds.   Neurological:      General: No focal deficit present.      Mental Status: He is alert and oriented to person, place, and time. Mental status is at baseline.   Psychiatric:         Mood and Affect: Mood normal.         Behavior: Behavior normal.         Thought Content: Thought content normal.         Judgment: Judgment normal.     Ortho Exam   RIGHT shoulder  Positive for pain and tenderness with palpation over the subcromial bursa.   Positive for signs of impingement with internal and external rotation.   Forward flexion is 0- 120 degrees, abduction is 0-110 degrees, external rotation is " 0-40 degrees.   Rotator cuff function is fairly well preserved except impingement at 90 degrees.   Montanez's sign is positive. Neer sign is positive.   Sulcus sign is negative. Drop arm sign is negative.   Positive for weakness.   Apprehension sign is negative.   Axillary nerve function is well preserved. Radial artery pulses are palpable.   There is no evidence of multidirectional instability.         Result Review :   Radiologic studies - see below for interpretation  RIGHT shoulder xrays   4 views were ordered by Carlos Rodriguez PA-C. Performed at [facility] on 8/17/2022. Images were independently viewed and interpreted by myself, my impression as follows:  Findings: Mild degenerative changes at the AC joint and the glenohumeral joint, particularly at the bony glenoid  Bony lesion: no  Soft tissues: within normal limits  Joint spaces: subnormal  Hardware appropriately positioned: not applicable  Prior studies available for comparison: no          PROCEDURE  Procedures           Assessment   Assessment and Plan    Problem List Items Addressed This Visit        Musculoskeletal and Injuries    Primary osteoarthritis of right knee    Relevant Orders    Ambulatory Referral to Pain Management (Completed)    Primary osteoarthritis of left knee    Relevant Orders    Ambulatory Referral to Pain Management (Completed)      Other Visit Diagnoses     Numbness and tingling in right hand    -  Primary    Relevant Orders    EMG & Nerve Conduction Test    Right shoulder pain, unspecified chronicity        Relevant Orders    Ambulatory Referral to Pain Management (Completed)          Follow Up   · Discussion of any imaging in detail. Discussion of orthopaedic goals.  · Risk, benefits, and merits of treatment alternatives reviewed with the patient. Treatment alternatives include: oral anti-inflammatories/NSAID, intra-articular steroid injection, physical therapy and further imaging/testing. Discussed  intra-articular steroid injection and MRI of the right shoulder.  He states the steroid injections have not worked in the past on other parts of his body therefore he would like to avoid that.  He did mention wanting to go to pain management.  He is in agreement to do an EMG/NCS of the right upper extremity.  · Ice, heat, and/or modalities as beneficial  · Patient is encouraged to call or return for any issues or concerns.  · Follow up with [facility] for results/PRN since he usually sees Dr. Paredes.  • Patient was given instructions and counseling regarding his condition or for health maintenance advice. Please see specific information pulled into the AVS if appropriate.     Carlos Rodriguez PA-C   Date of Encounter: 8/17/2022   Electronically signed by Carlos Rodriguez PA-C, 08/17/22, 6:04 AM EDT.   Electronically signed by Carlos Rodriguez PA-C, 08/17/22, 1:51 PM EDT.     EMR Dragon/Transcription disclaimer:  Much of this encounter note is an electronic transcription/translation of spoken language to printed text. The electronic translation of spoken language may permit erroneous, or at times, nonsensical words or phrases to be inadvertently transcribed; Although I have reviewed the note for such errors, some may still exist.

## 2022-09-15 ENCOUNTER — OFFICE VISIT (OUTPATIENT)
Dept: ORTHOPEDIC SURGERY | Facility: CLINIC | Age: 47
End: 2022-09-15

## 2022-09-15 VITALS — WEIGHT: 200 LBS | HEIGHT: 68 IN | TEMPERATURE: 98.5 F | BODY MASS INDEX: 30.31 KG/M2

## 2022-09-15 DIAGNOSIS — M17.12 PRIMARY OSTEOARTHRITIS OF LEFT KNEE: ICD-10-CM

## 2022-09-15 DIAGNOSIS — G56.03 BILATERAL CARPAL TUNNEL SYNDROME: Primary | ICD-10-CM

## 2022-09-15 DIAGNOSIS — M17.11 PRIMARY OSTEOARTHRITIS OF RIGHT KNEE: ICD-10-CM

## 2022-09-15 PROCEDURE — 99213 OFFICE O/P EST LOW 20 MIN: CPT | Performed by: ORTHOPAEDIC SURGERY

## 2022-09-15 NOTE — PROGRESS NOTES
"Chief Complaint  Pain and Follow-up of the Right Knee and Pain and Follow-up of the Left Knee    Subjective    History of Present Illness      Kimani Michaels is a 47 y.o. male who presents to [facility] for follow-up on bilateral hand pain and discomfort and bilateral knee and ankle swelling.  History of Present Illness this is a patient who has chronic musculoskeletal pain and discomfort.  He states that his pain never gets any better no matter what the modality he uses.  He has swelling in both the lower extremities.  He has knee pain and discomfort going along the medial face of the tibia from the medial joint line.  He has difficulty with cross body activities.  He also has bilateral hand pain with numbness and tingling.  The patient has symptoms involving the thumb, middle and index fingers.  He has difficulty with  strength.  Repetitive motion of the upper extremity bothers the patient.  We had set up an EMG and a nerve conduction study for the patient but he never did show up for his studies.  The patient claims he was not told about the exact time in place of the study.  We will try to go ahead and get the EMG set up again.  Pain Location:  BILATERAL knee and  BILATERAL wrist  Radiation: none  Quality: dull, aching  Intensity/Severity: moderate to severe  Duration: Several months  Progression of symptoms: yes, progressive worsening  Onset quality: gradual   Timing: intermittent  Aggravating Factors: repetitive motion  Alleviating Factors: Tylenol  Previous Episodes: yes  Associated Symptoms: pain, swelling  ADLs Affected: grooming/hygiene/toileting/personal care, recreational activities/sports  Previous Treatment: NSAIDs       Objective   Vital Signs:   Temp 98.5 °F (36.9 °C)   Ht 172.7 cm (68\")   Wt 90.7 kg (200 lb)   BMI 30.41 kg/m²     Physical Exam  Physical Exam  Vitals signs and nursing note reviewed.   Constitutional:       Appearance: Normal appearance.   Pulmonary: "      Effort: Pulmonary effort is normal.   Skin:     General: Skin is warm and dry.      Capillary Refill: Capillary refill takes less than 2 seconds.   Neurological:      General: No focal deficit present.      Mental Status: He is alert and oriented to person, place, and time. Mental status is at baseline.   Psychiatric:         Mood and Affect: Mood normal.         Behavior: Behavior normal.         Thought Content: Thought content normal.         Judgment: Judgment normal.     Ortho Exam   Bilateral knee (varus). Patient has crepitus throughout range of motion. Positive patellar grind test. Mild effusion. Lachman is negative. Pivot shift is negative. Anterior and posterior drawer signs are negative. Significant joint line tenderness is noted on the medial aspect of the knee. Patient has a varus orientation of the knee. There is fullness and tenderness in the Popliteal fossa. Mild distention of a Popliteal cyst is noted in this location. Range of motion in flexion is from 0-120 degrees. Neurovascular status is intact.  Dorsalis pedis and posterior tibial artery pulses are palpable. Common peroneal nerve function is well preserved. Patient's gait is cautious and antalgic. Skin and soft tissues are mildly swollen, consistent with synovitis and effusion. The patient has a significant limp with the first few steps after starting the gait cycle. Getting out of a chair takes a lot of effort due to pain on knee flexion.   Bilateral wrist-carpal tunnel. The patient is right hand dominant. The Skin is mildly swollen volarly over the proximal crease of the wrist. Radial pulse is palpable. Capillary refill is 2 seconds with a brisk return. Positive Tinel's sign at the wrist. Positive Phalen's sign at the wrist .  strength is impaired.  There is no muscle wasting or atrophy specifically involving the thenar muscle group.  Sensation to light touch and pinprick are diminished over the thumb, index and middle fingers.   Flexor and extensor tendon functions are well preserved. Moving 2 point discrimination is prolonged to 12mm over the median nerve distribution. No evidence of RSD.  There is no clinical evidence of renal disease, thyroid disease or any generalized neurological condition that could be causing nerve dysfunction.            Result Review :   The following data was reviewed by: Saud Paredes MD on 09/15/2022:                Procedures           Assessment   Assessment and Plan    Diagnoses and all orders for this visit:    1. Bilateral carpal tunnel syndrome (Primary)    2. Primary osteoarthritis of right knee    3. Primary osteoarthritis of left knee          Follow Up   · Compression/brace to the hands.  · Use a brace on the knee to prevent it from buckling and giving out.  · Calcium and vitamin D for bone health.  · Glucosamine, chondroitin and turmeric for cartilage health.  · Vitamin B6, B12 100 mcg tab 1 p.o. daily for nerve function improvement.  · Schedule an EMG, nerve conduction study for upper extremity nerve function evaluation and evaluation of compression of the median nerve at the wrist.  · Rest, ice, compression, and elevation (RICE) therapy  · Stretching and strengthening exercises of the flexors and extensors of the wrist.  · OTC Tylenol 500-1000mg by mouth every 6 hours as needed for pain   · Follow up in 3 month(s)  • Patient was given instructions and counseling regarding his condition or for health maintenance advice. Please see specific information pulled into the AVS if appropriate.     Saud Paredes MD   Date of Encounter: 9/15/2022   Electronically signed by Pamela Stein MA, 09/15/22, 10:25 AM EDT.     EMR Dragon/Transcription disclaimer:  Much of this encounter note is an electronic transcription/translation of spoken language to printed text. The electronic translation of spoken language may permit erroneous, or at times, nonsensical words or phrases to be inadvertently transcribed; Although I  have reviewed the note for such errors, some may still exist.

## 2022-11-01 ENCOUNTER — TRANSCRIBE ORDERS (OUTPATIENT)
Dept: ADMINISTRATIVE | Facility: HOSPITAL | Age: 47
End: 2022-11-01

## 2022-11-01 DIAGNOSIS — M54.12 CERVICAL RADICULOPATHY: Primary | ICD-10-CM

## 2022-11-14 ENCOUNTER — HOSPITAL ENCOUNTER (OUTPATIENT)
Dept: MRI IMAGING | Facility: HOSPITAL | Age: 47
Discharge: HOME | End: 2022-11-14
Admitting: STUDENT IN AN ORGANIZED HEALTH CARE EDUCATION/TRAINING PROGRAM

## 2022-11-14 DIAGNOSIS — M54.12 CERVICAL RADICULOPATHY: ICD-10-CM

## 2022-11-14 PROCEDURE — 72141 MRI NECK SPINE W/O DYE: CPT

## 2023-05-01 ENCOUNTER — TELEPHONE (OUTPATIENT)
Dept: NEUROLOGY | Facility: CLINIC | Age: 48
End: 2023-05-01

## 2023-05-01 ENCOUNTER — OFFICE VISIT (OUTPATIENT)
Dept: NEUROSURGERY | Facility: CLINIC | Age: 48
End: 2023-05-01
Payer: MEDICAID

## 2023-05-01 VITALS
HEIGHT: 68 IN | BODY MASS INDEX: 39.92 KG/M2 | HEART RATE: 87 BPM | WEIGHT: 263.4 LBS | SYSTOLIC BLOOD PRESSURE: 100 MMHG | DIASTOLIC BLOOD PRESSURE: 73 MMHG

## 2023-05-01 DIAGNOSIS — G89.29 CHRONIC RIGHT SHOULDER PAIN: ICD-10-CM

## 2023-05-01 DIAGNOSIS — M25.511 CHRONIC RIGHT SHOULDER PAIN: ICD-10-CM

## 2023-05-01 DIAGNOSIS — M19.019 AC JOINT ARTHROPATHY: ICD-10-CM

## 2023-05-01 DIAGNOSIS — M54.12 CERVICAL RADICULOPATHY: ICD-10-CM

## 2023-05-01 DIAGNOSIS — M50.30 DEGENERATIVE DISC DISEASE, CERVICAL: Primary | ICD-10-CM

## 2023-05-01 PROCEDURE — 99203 OFFICE O/P NEW LOW 30 MIN: CPT | Performed by: NURSE PRACTITIONER

## 2023-05-01 RX ORDER — PROPRANOLOL HYDROCHLORIDE 40 MG/1
TABLET ORAL
COMMUNITY
Start: 2023-04-23

## 2023-05-01 RX ORDER — CARIPRAZINE 3 MG/1
1 CAPSULE, GELATIN COATED ORAL DAILY
COMMUNITY
Start: 2023-04-24

## 2023-05-01 RX ORDER — METHOCARBAMOL 500 MG/1
500 TABLET, FILM COATED ORAL 4 TIMES DAILY
COMMUNITY

## 2023-05-01 RX ORDER — DESVENLAFAXINE SUCCINATE 50 MG/1
50 TABLET, EXTENDED RELEASE ORAL DAILY
COMMUNITY

## 2023-05-01 RX ORDER — LISINOPRIL 10 MG/1
10 TABLET ORAL DAILY
COMMUNITY

## 2023-05-01 RX ORDER — AMITRIPTYLINE HYDROCHLORIDE 25 MG/1
50 TABLET, FILM COATED ORAL
COMMUNITY
Start: 2023-04-26

## 2023-05-01 NOTE — TELEPHONE ENCOUNTER
[facility] not in network with patient's insurance, patient is aware per Leigh Ann, please advise

## 2023-05-01 NOTE — PROGRESS NOTES
Chief Complaint  Neck Pain (Neck pain that radiates into arms) and Numbness (Right fingers)    Subjective          Kimani Michaels who is a 47 y.o. year old male who presents to [facility] for evaluation of cervical spine.    The patient complains of pain located in the cervical spine.  Patients states the pain has been present for several years.  The pain came on gradually.  Pain is primarily in the neck and axial spine. The pain scale level is 8.  The pain does radiate. Dermatomes are located on right Cervical at: into the shoulder, chest, down the arm and to digits 4 and 5..  He has pain in both shoulders. The pain is constant and described as aching.  The pain is worse at no particular time of day. Patient states bending, twisting, lifting, head turning and raising the arms, gripping makes the pain worse.  Patient states rest makes the pain better.    Associated Symptoms Include: Numbness and Tingling. Muscle spasms.  Conservative Interventions Include: Epidural Steroids that were not very effective. He received a LILLI C7/T1 which provided only 30% improvement in his pain. Pain management referred him to [facility] for evaluation. He has tried gabapentin in the past, though this did not help.     He is followed by Orthopedics for diffuse joint pain. He was last evaluated in their office in September. Had an XR of the shoulder demonstrating mild degenerative changes at the AC joint.    Was this the result of an injury or accident?: No    History of Previous Spinal Surgery?: No    Nicotine use: smoker  (0.5 ppd)    BMI: Body mass index is 40.05 kg/m².    He also has concerns of low back pain with pain into the legs. This is worse on the right. Prolonged sitting, standing, walking, bending and twisting make his pain worse. He has not had imaging of the low back.     Review of Systems   Musculoskeletal: Positive for arthralgias, back pain, gait problem, myalgias, neck pain, neck  "stiffness and bursitis.   Neurological: Positive for weakness and numbness.   All other systems reviewed and are negative.       Objective   Vital Signs:   /73 (BP Location: Left arm, Patient Position: Sitting, Cuff Size: Large Adult)   Pulse 87   Ht 172.7 cm (68\")   Wt 119 kg (263 lb 6.4 oz)   BMI 40.05 kg/m²       Physical Exam  Vitals reviewed.   Constitutional:       Appearance: Normal appearance.   Musculoskeletal:      Right shoulder: Tenderness present. Decreased range of motion.      Left shoulder: No tenderness. Normal range of motion.      Cervical back: Tenderness present. Pain with movement present. Decreased range of motion.      Comments: Positive Tinels at the wrists and right elbow   Neurological:      Mental Status: He is alert and oriented to person, place, and time.      Motor: Motor strength is normal.      Gait: Gait is intact.      Deep Tendon Reflexes:      Reflex Scores:       Tricep reflexes are 2+ on the right side and 2+ on the left side.       Bicep reflexes are 2+ on the right side and 2+ on the left side.       Brachioradialis reflexes are 2+ on the right side and 2+ on the left side.       Neurologic Exam     Mental Status   Oriented to person, place, and time.   Level of consciousness: alert    Motor Exam   Muscle bulk: normal  Overall muscle tone: normal    Strength   Strength 5/5 throughout.     Sensory Exam   Light touch normal.     Gait, Coordination, and Reflexes     Gait  Gait: normal    Reflexes   Right brachioradialis: 2+  Left brachioradialis: 2+  Right biceps: 2+  Left biceps: 2+  Right triceps: 2+  Left triceps: 2+  Right Salgado: absent  Left Salgado: absent       Result Review :       Data reviewed: Radiologic studies MRI Cervical Spine on 11/14/22 at [facility] personally reviewed. Mild degenerative changes resulting in mild to moderate neural foraminal narrowing, particularly at C5/6 and C7/T1. No spinal canal stenosis. No cord signal change.       EMG/NCV on 9/21/22 " demonstrated chronic right C6 and C8 radiculopathy. Mild right median neuropathy. Slight prolonged latency of the ulnar nerve on the right at the elbow.    XR Right shoulder demonstrating mild arthritis of the AC joint and glenohumeral shoulder joint.      Assessment and Plan    Diagnoses and all orders for this visit:    1. Degenerative disc disease, cervical (Primary)  -     Ambulatory Referral to Physical Therapy Evaluate and treat; Heat, Electrotherapy; Moist heat; Tens (Home), E-stim; Cross Fiber; Stretching, ROM, Strengthening    2. Cervical radiculopathy  -     Ambulatory Referral to Physical Therapy Evaluate and treat; Heat, Electrotherapy; Moist heat; Tens (Home), E-stim; Cross Fiber; Stretching, ROM, Strengthening    3. Chronic right shoulder pain  -     Ambulatory Referral to Physical Therapy Evaluate and treat; Heat, Electrotherapy; Moist heat; Tens (Home), E-stim; Cross Fiber; Stretching, ROM, Strengthening    4. AC joint arthropathy  -     Ambulatory Referral to Physical Therapy Evaluate and treat; Heat, Electrotherapy; Moist heat; Tens (Home), E-stim; Cross Fiber; Stretching, ROM, Strengthening    Pt presenting for evaluation of chronic neck and shoulder pain. We reviewed his MRI Cervical Spine, demonstrating multilevel disc degeneration without high grade spinal canal or foraminal stenosis. No surgical recommendations at this time. His pain radiates into the AC joint with numbness in digits 4 and 5. Exam demonstrates tenderness in the cervical paraspinals and right AC joint with limited range of motion in the shoulder. Will refer to physical therapy for his neck and shoulder. Could consider an injection in the shoulder and follow up with Ortho if symptoms persist. He may also benefit from cervical TPIs.     He will follow up as needed.       Follow Up   Return if symptoms worsen or fail to improve.  Patient was given instructions and counseling regarding his condition or for health maintenance  advice.     -Physical therapy  -Follow up as needed

## 2023-06-26 NOTE — PROGRESS NOTES
NEW/FOLLOW UP VISIT    Patient: Kimani Michaels  ?  YOB: 1975  ?  Chief Complaint   Patient presents with   • Left Knee - Follow-up   • Right Knee - Follow-up      ?  HPI: This patient has multiple joint aches and pains.  Both his knees hurt right more than left.  He also has right shoulder pain with limited range of motion.  The patient has pain with numbness and tingling in the upper extremities.  He has bilateral carpal tunnel syndrome.  He does use a brace for his right knee as well as for both his wrists.  The patient states that the arthritis has affected his mobility in a negative fashion.  He states that he does not have any symptoms which appeared to be correctable with surgical intervention and he is not interested in any form of surgery anyway.  Pain Location: Right knee, right shoulder and both wrists.  Radiation: none  Quality: dull and aching  Intensity/Severity: moderate  Duration: For the past several years.  Onset quality: gradual   Timing: intermittent  Aggravating Factors:lifting  Heavy weights with the upper extremity and going up and down the steps.  Alleviating Factors: Using a brace and anti-inflammatory medication.  Previous Episodes: yes  Associated Symptoms: numbness/tingling, pain, swelling, decreased ROM  Previous Treatment: Anti- inflammatory medication.    This patient is an established patient.  This problem is not new to this examiner.      Allergies: No Known Allergies    Medications:   Home Medications:  Current Outpatient Medications on File Prior to Visit   Medication Sig   • acetaminophen-codeine (TYLENOL #3) 300-30 MG per tablet TK 1 T PO Q 8 H PRN P.   • ARIPiprazole (ABILIFY) 2 MG tablet    • baclofen (LIORESAL) 10 MG tablet TK 1 T PO BID PRN.   • busPIRone (BUSPAR) 15 MG tablet    • gabapentin (NEURONTIN) 400 MG capsule TK ONE C PO  TID   • gabapentin (NEURONTIN) 600 MG tablet TK 1 T PO TID   • lidocaine-prilocaine (EMLA) cream      No current  "facility-administered medications on file prior to visit.      Current Medications:  Scheduled Meds:  PRN Meds:.    I have reviewed the patient's medical history in detail and updated the computerized patient record.  Review and summarization of old records include:    Past Medical History:   Diagnosis Date   • Depression    • Rheumatoid arthritis (CMS/HCC)      Past Surgical History:   Procedure Laterality Date   • KNEE SURGERY     • TONSILLECTOMY       Social History     Occupational History   • Not on file   Tobacco Use   • Smoking status: Current Every Day Smoker   Substance and Sexual Activity   • Alcohol use: No   • Drug use: Not on file   • Sexual activity: Not on file      Social History     Social History Narrative   • Not on file     Family History   Family history unknown: Yes         Review of Systems   Constitutional: Negative.    HENT: Negative.    Eyes: Negative.    Respiratory: Negative.    Cardiovascular: Negative.    Gastrointestinal: Negative.    Endocrine: Negative.    Genitourinary: Negative.    Musculoskeletal: Positive for joint swelling.   Skin: Negative.    Allergic/Immunologic: Negative.    Neurological: Negative.    Hematological: Negative.    Psychiatric/Behavioral: Negative.           Wt Readings from Last 3 Encounters:   04/02/19 88.5 kg (195 lb)   05/10/18 74.8 kg (165 lb)     Ht Readings from Last 3 Encounters:   04/02/19 174 cm (68.5\")   05/10/18 175.3 cm (69\")     There is no height or weight on file to calculate BMI.  No height and weight on file for this encounter.  There were no vitals filed for this visit.      Physical Exam   Constitutional: Patient is oriented to person, place, and time. Appears well-developed and well-nourished.   HENT:   Head: Normocephalic and atraumatic.   Eyes: Conjunctivae and EOM are normal. Pupils are equal, round, and reactive to light.   Cardiovascular: Normal rate, regular rhythm, normal heart sounds and intact distal pulses.   Pulmonary/Chest: " Effort normal and breath sounds normal.   Musculoskeletal:   See detailed exam below   Neurological: Alert and oriented to person, place, and time. No sensory deficit. Coordination normal.   Skin: Skin is warm and dry. Capillary refill takes less than 2 seconds. No rash noted. No erythema.   Psychiatric: Patient has a normal mood and affect. Her behavior is normal. Judgment and thought content normal.   Nursing note and vitals reviewed.    Ortho Exam:   Right knee (varus). Patient has crepitus throughout range of motion. Positive patellar grind test. Mild effusion. Lachman is negative. Pivot shift is negative. Anterior and posterior drawer signs are negative. Significant joint line tenderness is noted on the medial aspect of the knee. Patient has a varus orientation of the knee. There is fullness and tenderness in the Popliteal fossa. Mild distention of a Popliteal cyst is noted in this location. Range of motion in flexion is from 0-110 degrees. Neurovascular status is intact.  Dorsalis pedis and posterior tibial artery pulses are palpable. Common peroneal nerve function is well preserved. Patient's gait is cautious and antalgic. Skin and soft tissues are mildly swollen, consistent with synovitis and effusion. The patient has a significant limp with the first few steps after starting the gait cycle. Getting out of a chair takes a lot of effort due to pain on knee flexion.     bilateral wrist. The Skin is  mildly swollen volarly over the proximal crease of the wrist. Radial pulse is palpable. Capillary refill is 2 seconds with a brisk return. Positive Tinel’s sign at the wrist. Positive Phalen’s sign at the wrist .  strength is impaired.  There is no muscle wasting or atrophy specifically involving the thenar muscle group.  Sensation to light touch and pinprick are diminished over the thumb, index and middle fingers.  Flexor and extensor tendon functions are well preserved. Moving 2 point discrimination is  prolonged to 12mm over the median nerve distribution. No evidence of RSD.  There is no clinical evidence of renal disease, thyroid disease or any generalized neurological condition that could be causing nerve dysfunction.    right Shoulder. Patient has signs of impingement with internal and external rotation. There is a lot of pain and tenderness for the patient over the subcromial bursa. Montanez’s sign is positive. Neer sign is positive. Forward flexion is 0-120 degrees, abduction is 0-120 degrees, external rotation is 0-30 degrees. Rotator cuff function is fairly well preserved except for the impingement at 90 degrees. Apprehension sign is negative. Axillary nerve function is well preserved. Radial artery pulses are palpable. There is no evidence of multidirectional instability. Sulcus sign is negative. Drop arm sign is negative. The patient has some ill-defined tenderness over the greater tuberosity of the humerus. The pain level is 4.  Diagnostics:       Assessment:  Kimani was seen today for follow-up and follow-up.    Diagnoses and all orders for this visit:    Bilateral carpal tunnel syndrome  -     meloxicam (MOBIC) 7.5 MG tablet; 1 Oral Daily with food.    Primary osteoarthritis of right knee  -     meloxicam (MOBIC) 7.5 MG tablet; 1 Oral Daily with food.    Shoulder impingement syndrome, right          Procedures  ?    Plan    · Compression/brace  · Rest, ice, compression, and elevation (RICE) therapy  · Stretching and strengthening exercises  · OTC Tylenol 1000mg by mouth every 4-6 hours as needed for pain   · Follow up in 6 month(s)    Saud Paredes MD  05/02/2019   Unique Flap 2 Text: The flap is incised and reflected downward. The medial portion of the flap was dissected just deep to the ordicularis oculi, and laterally the flap is dissected to the lateral canthal tendon area. The flap is rotated and advanced into place and sutured in a multilaminal fashion. The mucosa defect does not require closure. At completion, the repair is under little tension and the lid is well supported. All vertical tension is directed onto the temple.

## 2023-09-07 DIAGNOSIS — M17.12 PRIMARY OSTEOARTHRITIS OF LEFT KNEE: Primary | ICD-10-CM

## 2023-09-07 DIAGNOSIS — M17.11 PRIMARY OSTEOARTHRITIS OF RIGHT KNEE: ICD-10-CM

## 2023-09-14 ENCOUNTER — OFFICE VISIT (OUTPATIENT)
Dept: ORTHOPEDIC SURGERY | Facility: CLINIC | Age: 48
End: 2023-09-14
Payer: MEDICAID

## 2023-09-14 ENCOUNTER — HOSPITAL ENCOUNTER (OUTPATIENT)
Dept: GENERAL RADIOLOGY | Facility: HOSPITAL | Age: 48
Discharge: HOME | End: 2023-09-14
Admitting: ORTHOPAEDIC SURGERY
Payer: MEDICAID

## 2023-09-14 VITALS — WEIGHT: 264 LBS | HEIGHT: 68 IN | TEMPERATURE: 98.6 F | BODY MASS INDEX: 40.01 KG/M2

## 2023-09-14 DIAGNOSIS — M25.561 CHRONIC PAIN OF RIGHT KNEE: ICD-10-CM

## 2023-09-14 DIAGNOSIS — M17.12 PRIMARY OSTEOARTHRITIS OF LEFT KNEE: ICD-10-CM

## 2023-09-14 DIAGNOSIS — M25.562 CHRONIC PAIN OF LEFT KNEE: Primary | ICD-10-CM

## 2023-09-14 DIAGNOSIS — M17.11 PRIMARY OSTEOARTHRITIS OF RIGHT KNEE: ICD-10-CM

## 2023-09-14 DIAGNOSIS — G89.29 CHRONIC PAIN OF LEFT KNEE: Primary | ICD-10-CM

## 2023-09-14 DIAGNOSIS — G89.29 CHRONIC PAIN OF RIGHT KNEE: ICD-10-CM

## 2023-09-14 PROCEDURE — 73560 X-RAY EXAM OF KNEE 1 OR 2: CPT

## 2023-09-14 PROCEDURE — 99213 OFFICE O/P EST LOW 20 MIN: CPT | Performed by: ORTHOPAEDIC SURGERY

## 2023-09-14 NOTE — PROGRESS NOTES
"Chief Complaint  Follow-up and Pain of the Right Knee, Follow-up and Pain of the Left Knee, Follow-up and Pain of the Right Wrist, Follow-up and Pain of the Left Wrist, Follow-up and Pain of the Left Ankle, and Follow-up and Pain of the Right Ankle    Subjective    History of Present Illness      Kimani Michaels is a 48 y.o. male who presents to [facility] for multiple joint pain especially both knees  History of Present Illness He is complaining of bilateral knee pain and stiffness. He was injected by DR Gonzales last week but that did help for only 1 month. No H/O recent trauma to the knees. Unable to squat on the ground  He uses a cane for assistance with ambulation.  Pain Location:  BILATERAL knee,  BILATERAL ankle, and  BILATERAL wrist  Radiation: none  Quality: dull, aching  Intensity/Severity: varies between mild and severe  Duration:  several years  Progression of symptoms: no worsening, symptoms stable/unchanged  Onset quality: gradual   Timing: intermittent  Aggravating Factors: going up and down stairs, kneeling, rising after sitting, walking, squatting, standing, rotating motion, repetitive motion  Alleviating Factors: NSAIDs, steroid injection (IM), rest  Previous Episodes: yes  Associated Symptoms: pain, swelling, clicking/popping, decreased ROM, decreased strength  ADLs Affected: grooming/hygiene/toileting/personal care, dressing, ambulating, work related activities, recreational activities/sports  Previous Treatment: NSAIDs, brace, and intra-articular injection       Objective   Vital Signs:   Temp 98.6 °F (37 °C)   Ht 172.7 cm (68\")   Wt 120 kg (264 lb)   BMI 40.14 kg/m²     Physical Exam  Physical Exam  Vitals signs and nursing note reviewed.   Constitutional:       Appearance: Normal appearance.   Pulmonary:      Effort: Pulmonary effort is normal.   Skin:     General: Skin is warm and dry.      Capillary Refill: Capillary refill takes less than 2 seconds. "   Neurological:      General: No focal deficit present.      Mental Status: He is alert and oriented to person, place, and time. Mental status is at baseline.   Psychiatric:         Mood and Affect: Mood normal.         Behavior: Behavior normal.         Thought Content: Thought content normal.         Judgment: Judgment normal.     Ortho Exam   Bilateral knee (varus). Patient has crepitus throughout range of motion. Positive patellar grind test. Mild effusion. Lachman is negative. Pivot shift is negative. Anterior and posterior drawer signs are negative. Significant joint line tenderness is noted on the medial aspect of the knee. Patient has a varus orientation of the knee. There is fullness and tenderness in the Popliteal fossa. Mild distention of a Popliteal cyst is noted in this location. Range of motion in flexion is from 0-110 degrees. Neurovascular status is intact.  Dorsalis pedis and posterior tibial artery pulses are palpable. Common peroneal nerve function is well preserved. Patient's gait is cautious and antalgic. Skin and soft tissues are mildly swollen, consistent with synovitis and effusion. The patient has a significant limp with the first few steps after starting the gait cycle. Getting out of a chair takes a lot of effort due to pain on knee flexion.     Result Review :  The following data was reviewed by: Saud Paredes MD on 09/14/2023:  Radiologic studies - see below for interpretation  BILATERAL knee xrays  weightbearing/standing ap/lateral views were ordered by Saud Paredes MD. Performed at [facility] on 09/14/2023. Images were independently viewed and interpreted by myself, my impression as follows:      bilateral Knee X-Ray  Indication: evaluation of pain and swelling  AP, Lateral views  Findings: moderate joint space narrowing noted  no bony lesion  Soft tissues within normal limits  decreased joint spaces  Hardware appropriately positioned not applicable      no prior  studies available for comparison.    This patient's x-ray report was graded according to the Kellgren and Florentino classification.  This took into account the joint space narrowing, osteophyte formation, sclerosis of the distal femur/proximal tibia along with deformity of those bones.  The findings were indicative of K L grade 2.    X-RAY was ordered and reviewed by Saud Paredes MD       Procedures           Assessment   Assessment and Plan    Diagnoses and all orders for this visit:    1. Chronic pain of left knee (Primary)    2. Chronic pain of right knee          Follow Up   Compression/brace to prevent the knee from buckling and giving out  Rest, ice, compression, and elevation (RICE) therapy  Stretching and strengthening exercises  OTC Alternate Ibuprofen and Tylenol as needed  Follow up as needed  Patient was given instructions and counseling regarding his condition or for health maintenance advice. Please see specific information pulled into the AVS if appropriate.     Saud Paredes MD   Date of Encounter: 9/14/2023   Electronically signed by Saud Paredes MD, 09/14/23, 9:34 AM EDT.     EMR Dragon/Transcription disclaimer:  Much of this encounter note is an electronic transcription/translation of spoken language to printed text. The electronic translation of spoken language may permit erroneous, or at times, nonsensical words or phrases to be inadvertently transcribed; Although I have reviewed the note for such errors, some may still exist.

## 2023-10-01 PROBLEM — M25.561 CHRONIC PAIN OF RIGHT KNEE: Status: ACTIVE | Noted: 2023-10-01

## 2023-10-01 PROBLEM — M25.562 CHRONIC PAIN OF LEFT KNEE: Status: ACTIVE | Noted: 2023-10-01

## 2023-10-01 PROBLEM — G89.29 CHRONIC PAIN OF LEFT KNEE: Status: ACTIVE | Noted: 2023-10-01
